# Patient Record
Sex: FEMALE | Race: WHITE
[De-identification: names, ages, dates, MRNs, and addresses within clinical notes are randomized per-mention and may not be internally consistent; named-entity substitution may affect disease eponyms.]

---

## 2019-10-16 ENCOUNTER — HOSPITAL ENCOUNTER (EMERGENCY)
Dept: HOSPITAL 25 - UCCORT | Age: 44
Discharge: HOME | End: 2019-10-16
Payer: COMMERCIAL

## 2019-10-16 VITALS — DIASTOLIC BLOOD PRESSURE: 66 MMHG | SYSTOLIC BLOOD PRESSURE: 135 MMHG

## 2019-10-16 DIAGNOSIS — N39.0: Primary | ICD-10-CM

## 2019-10-16 DIAGNOSIS — R53.81: ICD-10-CM

## 2019-10-16 DIAGNOSIS — R11.0: ICD-10-CM

## 2019-10-16 PROCEDURE — 87186 SC STD MICRODIL/AGAR DIL: CPT

## 2019-10-16 PROCEDURE — 99203 OFFICE O/P NEW LOW 30 MIN: CPT

## 2019-10-16 PROCEDURE — G0463 HOSPITAL OUTPT CLINIC VISIT: HCPCS

## 2019-10-16 PROCEDURE — 87086 URINE CULTURE/COLONY COUNT: CPT

## 2019-10-16 PROCEDURE — 87077 CULTURE AEROBIC IDENTIFY: CPT

## 2019-10-16 PROCEDURE — 81003 URINALYSIS AUTO W/O SCOPE: CPT

## 2019-10-16 NOTE — UC
Complaint Female HPI





- HPI Summary


HPI Summary: 


43-year-old female presents with onset of general malaise, low back pain, 

dysuria, frequency, urgency, and mild nausea since yesterday.  Denies fever, 

chills, abdominal pain, vomiting, hematuria, vaginal discharge, or abnormal 

bleeding.








- History Of Current Complaint


Chief Complaint: UCGU


Stated Complaint: LOW BACK PAIN/URINARY SYMPTOMS


Time Seen by Provider: 10/16/19 20:36


Hx Obtained From: Patient


Hx Last Menstrual Period: "a week ago"


Pain Intensity: 7





- Allergies/Home Medications


Allergies/Adverse Reactions: 


 Allergies











Allergy/AdvReac Type Severity Reaction Status Date / Time


 


No Known Allergies Allergy   Verified 10/16/19 20:08














PMH/Surg Hx/FS Hx/Imm Hx


Previously Healthy: Yes - Denies significant PMH





- Surgical History


Surgical History: Yes


Surgery Procedure, Year, and Place: Tonsils





- Family History


Known Family History: Positive: Non-Contributory





- Social History


Occupation: Employed Full-time


Lives: With Family


Alcohol Use: None


Substance Use Type: None


Smoking Status (MU): Never Smoked Tobacco





Review of Systems


All Other Systems Reviewed And Are Negative: Yes


Constitutional: Positive: Chills.  Negative: Fever


Skin: Negative: Rash


Respiratory: Positive: Negative


Cardiovascular: Positive: Negative


Gastrointestinal: Positive: Abdominal Pain - suprapubic.  Negative: Vomiting, 

Nausea


Genitourinary: Positive: Dysuria, Frequency, Urgency.  Negative: Hematuria, 

Vaginal/Penile Discharge, Abnormal Bleeding


Musculoskeletal: Positive: Negative


Neurological: Positive: Negative


Is Patient Immunocompromised?: No





Physical Exam





- Summary


Physical Exam Summary: 


GENERAL APPEARANCE: Alert and cooperative adult female who appears ill, non-

toxic, and in no acute distress.





CARDIAC: Normal S1 and S2. No S3, S4 or murmurs. Rhythm is regular. There is no 

peripheral edema, cyanosis or pallor. Extremities are warm and well perfused. 

Capillary refill is less than 2 seconds. Peripheral pulses intact.





LUNGS: Clear to auscultation without rales, rhonchi, wheezing or diminished 

breath sounds.





ABDOMEN: Positive bowel sounds. Soft, nondistended, nontender. No guarding or 

rebound. No masses or hepatosplenomegally. Mild bilateral CVA tenderness.





MUSKULOSKELETAL: ROM intact to all extremities. No joint erythema or 

tenderness. Normal muscular development. Normal gait.





SKIN: Skin normal color, texture and turgor with no lesions or eruptions.








Triage Information Reviewed: Yes


Vital Signs: 


 Initial Vital Signs











Temp  99.7 F   10/16/19 20:09


 


Pulse  102   10/16/19 20:09


 


Resp  20   10/16/19 20:09


 


BP  135/66   10/16/19 20:09


 


Pulse Ox  96   10/16/19 20:09











Vital Signs Reviewed: Yes





 Complaint Female Dx





- Course


Course Of Treatment: 


43-year-old female presents with onset of general malaise, low back pain, 

dysuria, frequency, urgency, and mild nausea since yesterday.  Denies fever, 

chills, abdominal pain, vomiting, hematuria, vaginal discharge, or abnormal 

bleeding.  Afebrile.  Mildly hypertensive and tachycardic otherwise vital signs 

stable.  On exam patient appeared ill but nontoxic and in no acute distress.  

She had some mild bilateral CVA tenderness but otherwise had an unremarkable 

exam.  Her point-of-care urinalysis showed 1+ leukocyte esterase, 3+ blood, 

positive nitrites, 3+ protein, trace ketones, and 1+ bilirubin.  Urine culture 

is pending.  Reviewed the results with the patient.  We discussed that her 

symptoms and urinalysis were suggestive of a urinary tract infection and with 

her CVA tenderness could be an early pyelonephritis.  Will plan to treat her 

with Bactrim DS 1 tablet twice daily 7 days and provided her with Pyridium 100 

mg 3 times a day 2 days to help with her discomfort.  She received the first 

dose of each of these medications in the clinic.  She is to follow-up with her 

primary care provider in 3 days if symptoms do not improve.  Anticipatory 

guidance and warning symptoms were reviewed with the patient.  Verbalizes 

understanding and agrees with plan of care.








- Differential Dx/Diagnosis


Differential Diagnosis/HQI/PQRI: Renal Colic, Urinary Tract Infection, Other - 

Pyelonephritis


Provider Diagnosis: 


 UTI (urinary tract infection)








Discharge ED





- Sign-Out/Discharge


Documenting (check all that apply): Patient Departure


All imaging exams completed and their final reports reviewed: No Studies





- Discharge Plan


Condition: Stable


Disposition: HOME


Prescriptions: 


Phenazopyridine TAB* [Pyridium 100 mg TAB*] 100 mg PO TID #4 tab


Sulfamethox/Trimethoprim DS* [Bactrim /160 TAB*] 1 tab PO BID #12 tab


Patient Education Materials:  Urinary Tract Infection in Women (ED)


Referrals: 


Litzy Abraham MD [Primary Care Provider] - 3 Days (If no improvement in 

symptoms.)


Additional Instructions: 


Your urine test in the clinic today is suggestive of a urinary tract infection. 

We will start you on an antibiotic to treat for the infection. We will also 

send a urine culture today to see what bacteria grow out and make sure the 

antibiotic you were prescribed is appropriate to treat the infection. It will 

take 48-72 hours to get these results. We will contact you if there is any 

change in your treatment plan.





Start Bactrim DS 1 tab twice a day for 7 days. We gave you the first dose in 

the clinic.





Take Pyridium 1 tablet every 8 hours for next 2 days to help with the 

discomfort. This medication will turn your urine an orange color. We gave you 

the first dose in the clinic.





Drink plenty of fluids.





To help prevent urinary tract infections:





1) Be sure to wipe from front to back.





2) Urinate immediately after any sexual intercourse.





3) Avoid taking bubble baths.





Follow up with your primary care provider in 3 days if symptoms do not improve.





Seek immediate medical attention in the emergency room if you develop fever 

greater than 100.5 F, have severe abdominal pain, persistent vomiting, or any 

worsening of symptoms.





- Billing Disposition and Condition


Condition: STABLE


Disposition: Home